# Patient Record
Sex: FEMALE | Race: WHITE
[De-identification: names, ages, dates, MRNs, and addresses within clinical notes are randomized per-mention and may not be internally consistent; named-entity substitution may affect disease eponyms.]

---

## 2018-05-18 ENCOUNTER — HOSPITAL ENCOUNTER (INPATIENT)
Dept: HOSPITAL 38 - CC.ED | Age: 78
LOS: 4 days | Discharge: HOME | DRG: 872 | End: 2018-05-22
Attending: FAMILY MEDICINE | Admitting: NURSE PRACTITIONER
Payer: MEDICARE

## 2018-05-18 DIAGNOSIS — D63.8: ICD-10-CM

## 2018-05-18 DIAGNOSIS — M46.92: ICD-10-CM

## 2018-05-18 DIAGNOSIS — Z90.5: ICD-10-CM

## 2018-05-18 DIAGNOSIS — Z88.0: ICD-10-CM

## 2018-05-18 DIAGNOSIS — A41.9: Primary | ICD-10-CM

## 2018-05-18 DIAGNOSIS — N28.9: ICD-10-CM

## 2018-05-18 DIAGNOSIS — N39.0: ICD-10-CM

## 2018-05-18 DIAGNOSIS — Z87.440: ICD-10-CM

## 2018-05-18 DIAGNOSIS — Z79.899: ICD-10-CM

## 2018-05-18 DIAGNOSIS — R91.8: ICD-10-CM

## 2018-05-18 DIAGNOSIS — R05: ICD-10-CM

## 2018-05-18 DIAGNOSIS — I10: ICD-10-CM

## 2018-05-18 LAB
CHLORIDE SERPL-SCNC: 101 MEQ/L (ref 98–106)
SODIUM SERPL-SCNC: 134 MEQ/L (ref 136–145)

## 2018-05-18 PROCEDURE — C9113 INJ PANTOPRAZOLE SODIUM, VIA: HCPCS

## 2018-05-18 NOTE — EDM.PDOC
ED HPI GENERAL MEDICAL PROBLEM





- General


Chief Complaint: General


Stated Complaint: not feeling well


Time Seen by Provider: 05/18/18 21:45


Source of Information: Reports: Patient, Family


History Limitations: Reports: No Limitations





- History of Present Illness


INITIAL COMMENTS - FREE TEXT/NARRATIVE: 


Nidhi is a pleasant 78 year old female with PMH of hypertension and renal cancer

, who presents to the ED with c/o "just not feeling well." She reports that 

starting on Tuesday evening, she started having worsening neck pain. She 

reports she has bad arthritis and initially she just attributed it to that. She 

reports that the days following she started to feel just achy all over. She 

reports that for about the past month she has had a very decreased appetite. 

She reports this started after she started being treated for a UTI. She reports 

she had a hard time tolerating the antibiotic and since then hasn't had much of 

an appetite. She also reports she has pain in her neck and intermittent 

dizziness. She has also noticed that her gait is a little "off balance as well.

" She reports she has a hard time "walking straight." She denies any urinary 

frequency, urgency or dysuria. Denies any abdominal pain, vomiting, diarrhea, 

constipation. Has had some intermittent nausea, but nothing significant. She 

just reports foods don't appeal to her. She reports she has lost weight, but is 

unsure of how much.  She denies any chest pain. Initially denies any shortness 

of breath at rest. Was pointed out to her that she seems to be winded talking 

and she then goes on to state that maybe she has been a little more short of 

breath than normal. Reports occasional nonproductive cough. Has had some 

chills. Denies any headache, fever, weakness. 





 


Onset Date: 05/15/18


Duration: Getting Worse


Location: Reports: Generalized


Associated Symptoms: Reports: Cough, Fever/Chills (chills), Loss of Appetite, 

Nausea/Vomiting (nausea), Shortness of Breath, Weakness.  Denies: Confusion, 

Chest Pain, cough w sputum, Diaphoresis, Headaches, Malaise, Rash, Seizure, 

Syncope


  ** Generalized


Pain Score (Numeric/FACES): 4





- Related Data


 Allergies











Allergy/AdvReac Type Severity Reaction Status Date / Time


 


Penicillins Allergy  Cannot Verified 05/18/18 21:21





   Remember  











Home Meds: 


 Home Meds





Lysine 1,000 mg PO DAILY 05/18/18 [History]


Ramipril 10 mg PO DAILY 05/18/18 [History]


Sertraline [Zoloft] 50 mg PO BEDTIME 05/18/18 [History]


Spironolactone [Aldactone] 25 mg PO DAILY 05/18/18 [History]











Past Medical History


HEENT History: Reports: Cataract


Cardiovascular History: Reports: Hypertension


Oncologic (Cancer) History: Reports: Renal





- Past Surgical History


HEENT Surgical History: Reports: Cataract Surgery


GI Surgical History: Reports: Appendectomy, Colonoscopy





Social & Family History





- Tobacco Use


Smoking Status *Q: Never Smoker


Second Hand Smoke Exposure: No





ED ROS GENERAL





- Review of Systems


Review Of Systems: ROS reveals no pertinent complaints other than HPI.


Constitutional: Reports: Fever, Chills, Weakness, Fatigue, Decreased Appetite, 

Weight Loss


HEENT: Reports: No Symptoms


Respiratory: Reports: Shortness of Breath, Cough.  Denies: Wheezing, Pleuritic 

Chest Pain, Sputum, Hemoptysis


Cardiovascular: Reports: Dyspnea on Exertion, Lightheadedness.  Denies: Chest 

Pain, Edema, Orthopnea, Syncope


Endocrine: Reports: Fatigue


GI/Abdominal: Reports: Decreased Appetite, Nausea.  Denies: Abdominal Pain, 

Black Stool, Bloody Stool, Constipation, Hematemesis, Hematochezia, Melena, 

Vomiting


: Denies: Dysuria, Flank Pain, Frequency, Hematuria, Urgency


Musculoskeletal: Reports: Neck Pain


Skin: Reports: No Symptoms


Neurological: Reports: Dizziness, Difficulty Walking, Weakness, Gait 

Disturbance.  Denies: Confusion, Headache, Numbness, Syncope, Tingling


Psychiatric: Reports: No Symptoms


Hematologic/Lymphatic: Reports: No Symptoms


Immunologic: Reports: No Symptoms





ED EXAM, GENERAL





- Physical Exam


Exam: See Below


Exam Limited By: No Limitations


General Appearance: Alert, WD/WN, No Apparent Distress


Eye Exam: Bilateral Eye: EOMI, Normal Fundi, Normal Inspection, PERRL


Ears: Normal External Exam, Normal Canal, Hearing Grossly Normal, Normal TMs


Nose: Normal Inspection, Normal Mucosa, No Blood


Throat/Mouth: Normal Inspection, Normal Lips, Normal Teeth, Normal Gums, Normal 

Oropharynx, Normal Voice, No Airway Compromise


Head: Atraumatic, Normocephalic


Neck: Normal Inspection, Supple, Non-Tender, Full Range of Motion


Respiratory/Chest: No Respiratory Distress, Lungs Clear, Normal Breath Sounds, 

No Accessory Muscle Use, Chest Non-Tender


Cardiovascular: Normal Peripheral Pulses, Regular Rate, Rhythm, No Edema, No 

Gallop, No JVD, No Murmur, No Rub


GI/Abdominal: Normal Bowel Sounds, Soft, Non-Tender, No Organomegaly, No 

Distention, No Abnormal Bruit, No Mass


Back Exam: Normal Inspection, Full Range of Motion.  No: CVA Tenderness (L), 

CVA Tenderness (R), Vertebral Tenderness


Extremities: Normal Inspection, Normal Range of Motion, Non-Tender, Normal 

Capillary Refill, No Pedal Edema


Neurological: Alert, Oriented, CN II-XII Intact, Normal Cognition, Normal Gait, 

Normal Reflexes, No Motor/Sensory Deficits


Psychiatric: Normal Affect, Normal Mood


Skin Exam: Warm, Dry, Intact, Normal Color, No Rash


Lymphatic: No Adenopathy





Course





- Vital Signs


Last Recorded V/S: 


 Last Vital Signs











Temp  97.5 F   05/19/18 03:27


 


Pulse  78   05/19/18 03:27


 


Resp  16   05/19/18 03:27


 


BP  136/75   05/19/18 03:27


 


Pulse Ox  98   05/19/18 03:27














- Orders/Labs/Meds


Orders: 


 Active Orders 24 hr











 Category Date Time Status


 


 Chest 2V [CR] Stat Exams  05/18/18 21:29 Taken


 


 Head wo Cont [CT] Stat Exams  05/18/18 22:17 Taken


 


 CULTURE BLOOD [BC] Stat Lab  05/18/18 21:35 Received


 


 CULTURE BLOOD [BC] Stat Lab  05/18/18 21:40 Received


 


 CULTURE URINE [RM] Stat Lab  05/18/18 22:04 Received








 Medication Orders





Acetaminophen (Tylenol)  650 mg PO Q4H PRN


   PRN Reason: Pain (Mild 1-3)/fever


Albuterol/Ipratropium (Duoneb 3.0-0.5 Mg/3 Ml)  3 ml NEB Q4H PRN


   PRN Reason: Shortness Of Breath/wheezing


Ceftriaxone Sodium (Rocephin)  1 gm IVPUSH Q24H [facility]


   Last Admin: 05/19/18 00:26  Dose: 1 gm


Enoxaparin Sodium (Lovenox)  30 mg SUBCUT Q24H [facility]


   Last Admin: 05/19/18 00:25  Dose: 30 mg


Azithromycin 500 mg/ Sodium (Chloride)  250 mls @ 250 mls/hr IV Q24H [facility]


   Last Admin: 05/19/18 00:25  Dose: 250 mls/hr


Lactated Ringer's (Ringers, Lactated)  1,000 mls @ 125 mls/hr IV ASDIRECTED [place]


   Last Admin: 05/19/18 00:26  Dose: 125 mls/hr


Magnesium Hydroxide (Milk Of Magnesia)  30 ml PO Q12H PRN


   PRN Reason: Constipation


Temazepam (Restoril)  15 mg PO BEDTIME PRN


   PRN Reason: Sleep








Labs: 


 Laboratory Tests











  05/18/18 05/18/18 05/18/18 Range/Units





  21:35 21:35 21:35 


 


WBC  13.8 H    (5.0-10.0)  10^3/uL


 


RBC  2.91 L    (4.00-5.50)  10^6/uL


 


Hgb  9.8 L    (12.0-16.0)  g/dL


 


Hct  29.4 L    (37.0-47.0)  %


 


MCV  101.0 H    (82.0-94.0)  fL


 


MCH  33.7 H    (27.0-32.0)  pg


 


MCHC  33.3    (33.0-38.0)  g/dL


 


RDW Coeff of [place]  13.0    (11.0-15.0)  %


 


Plt Count  130 L    (150-400)  10^3/uL


 


Neut % (Auto)  80.0    (35-85)  %


 


Lymph % (Auto)  9.7 L    (10-55)  %


 


Mono % (Auto)  10.0    (0-16)  %


 


Eos % (Auto)  0.2    (0-5)  %


 


Baso % (Auto)  0.1    (0-3)  %


 


Neut # (Auto)  11.00 H    (1.80-7.00)  10^3/uL


 


Lymph # (Auto)  1.33    (1.00-4.80)  10^3/uL


 


Mono # (Auto)  1.38 H    (0.00-0.80)  10^3/uL


 


Eos # (Auto)  0.03    (0.00-0.45)  10^3/uL


 


Baso # (Auto)  0.01    10^3/uL


 


D-Dimer, Quantitative     (0.00-0.50)  


 


Sodium   134 L   (136-145)  mEq/L


 


Potassium   3.8   (3.5-5.0)  mEq/L


 


Chloride   101   ()  mEq/L


 


Carbon Dioxide   17 L   (21-32)  mmol/L


 


BUN   48 H   (7-18)  mg/dL


 


Creatinine   2.2 H   (0.6-1.0)  mg/dL


 


Est Cr Clr Drug Dosing   16.13   mL/min


 


Estimated GFR (MDRD)   21 L   (>=60)  mL/min


 


Glucose   107 H   (75-99)  mg/dL


 


Lactic Acid    0.7  (0.4-2.0)  mmol/L


 


Calcium   9.5   (8.4-10.1)  mg/dL


 


Total Bilirubin   0.5   (0.0-1.0)  mg/dL


 


AST   12 L   (15-37)  U/L


 


ALT   18   (12-78)  U/L


 


Alkaline Phosphatase   64   ()  U/L


 


Troponin I   < 0.017   (0.00-0.06)  ng/mL


 


C-Reactive Protein   24.0 H   (0.2-0.8)  mg/dL


 


Total Protein   6.6   (6.4-8.2)  g/dL


 


Albumin   3.0 L   (3.4-5.0)  g/dL


 


Urine Color     (YELLOW)  


 


Urine Appearance     (CLEAR)  


 


Urine pH     (4.5-8.0)  


 


Ur Specific Gravity     (1.003-1.020)  


 


Urine Protein     (NEGATIVE)  mg/dL


 


Urine Glucose (UA)     (NEGATIVE)  mg/dL


 


Urine Ketones     (NEGATIVE)  mg/dL


 


Urine Occult Blood     (NEGATIVE)  


 


Urine Nitrite     (NEGATIVE)  


 


Urine Bilirubin     (NEGATIVE)  


 


Urine Urobilinogen     (0.2-1.0)  EU/dL


 


Ur Leukocyte Esterase     (NEGATIVE)  


 


Urine RBC     (0-5)  /HPF


 


Urine WBC     (0-5)  /HPF


 


Ur Squamous Epith Cells     (NOT SEEN)  /HPF


 


Urine Bacteria     (NOT SEEN)  /HPF














  05/18/18 05/18/18 Range/Units





  21:35 21:53 


 


WBC    (5.0-10.0)  10^3/uL


 


RBC    (4.00-5.50)  10^6/uL


 


Hgb    (12.0-16.0)  g/dL


 


Hct    (37.0-47.0)  %


 


MCV    (82.0-94.0)  fL


 


MCH    (27.0-32.0)  pg


 


MCHC    (33.0-38.0)  g/dL


 


RDW Coeff of [place]    (11.0-15.0)  %


 


Plt Count    (150-400)  10^3/uL


 


Neut % (Auto)    (35-85)  %


 


Lymph % (Auto)    (10-55)  %


 


Mono % (Auto)    (0-16)  %


 


Eos % (Auto)    (0-5)  %


 


Baso % (Auto)    (0-3)  %


 


Neut # (Auto)    (1.80-7.00)  10^3/uL


 


Lymph # (Auto)    (1.00-4.80)  10^3/uL


 


Mono # (Auto)    (0.00-0.80)  10^3/uL


 


Eos # (Auto)    (0.00-0.45)  10^3/uL


 


Baso # (Auto)    10^3/uL


 


D-Dimer, Quantitative  0.61 H   (0.00-0.50)  


 


Sodium    (136-145)  mEq/L


 


Potassium    (3.5-5.0)  mEq/L


 


Chloride    ()  mEq/L


 


Carbon Dioxide    (21-32)  mmol/L


 


BUN    (7-18)  mg/dL


 


Creatinine    (0.6-1.0)  mg/dL


 


Est Cr Clr Drug Dosing    mL/min


 


Estimated GFR (MDRD)    (>=60)  mL/min


 


Glucose    (75-99)  mg/dL


 


Lactic Acid    (0.4-2.0)  mmol/L


 


Calcium    (8.4-10.1)  mg/dL


 


Total Bilirubin    (0.0-1.0)  mg/dL


 


AST    (15-37)  U/L


 


ALT    (12-78)  U/L


 


Alkaline Phosphatase    ()  U/L


 


Troponin I    (0.00-0.06)  ng/mL


 


C-Reactive Protein    (0.2-0.8)  mg/dL


 


Total Protein    (6.4-8.2)  g/dL


 


Albumin    (3.4-5.0)  g/dL


 


Urine Color   Yellow  (YELLOW)  


 


Urine Appearance   Clear  (CLEAR)  


 


Urine pH   5.5  (4.5-8.0)  


 


Ur Specific Gravity   1.015  (1.003-1.020)  


 


Urine Protein   100 H  (NEGATIVE)  mg/dL


 


Urine Glucose (UA)   Negative  (NEGATIVE)  mg/dL


 


Urine Ketones   Negative  (NEGATIVE)  mg/dL


 


Urine Occult Blood   Negative  (NEGATIVE)  


 


Urine Nitrite   Negative  (NEGATIVE)  


 


Urine Bilirubin   Negative  (NEGATIVE)  


 


Urine Urobilinogen   0.2  (0.2-1.0)  EU/dL


 


Ur Leukocyte Esterase   Moderate H  (NEGATIVE)  


 


Urine RBC   Not seen  (0-5)  /HPF


 


Urine WBC   30-40 H  (0-5)  /HPF


 


Ur Squamous Epith Cells   Few H  (NOT SEEN)  /HPF


 


Urine Bacteria   Few H  (NOT SEEN)  /HPF











Meds: 


Medications











Generic Name Dose Route Start Last Admin





  Trade Name Freq  PRN Reason Stop Dose Admin


 


Acetaminophen  650 mg  05/18/18 23:27  





  Tylenol  PO   





  Q4H PRN   





  Pain (Mild 1-3)/fever   





     





     





     


 


Albuterol/Ipratropium  3 ml  05/18/18 23:27  





  Duoneb 3.0-0.5 Mg/3 Ml  NEB   





  Q4H PRN   





  Shortness Of Breath/wheezing   





     





     





     


 


Ceftriaxone Sodium  1 gm  05/19/18 00:00  05/19/18 00:26





  Rocephin  IVPUSH   1 gm





  Q24H [place]   Administration





     





     





     





     


 


Enoxaparin Sodium  30 mg  05/18/18 23:00  05/19/18 00:25





  Lovenox  SUBCUT   30 mg





  Q24H [place]   Administration





     





     





     





     


 


Azithromycin 500 mg/ Sodium  250 mls @ 250 mls/hr  05/18/18 23:00  05/19/18 00:

25





  Chloride  IV   250 mls/hr





  Q24H [place]   Administration





     





     





     





     


 


Lactated Ringer's  1,000 mls @ 125 mls/hr  05/18/18 23:27  05/19/18 00:26





  Ringers, Lactated  IV   125 mls/hr





  ASDIRECTED [place]   Administration





     





     





     





     


 


Magnesium Hydroxide  30 ml  05/18/18 23:27  





  Milk Of Magnesia  PO   





  Q12H PRN   





  Constipation   





     





     





     


 


Temazepam  15 mg  05/18/18 23:27  





  Restoril  PO   





  BEDTIME PRN   





  Sleep   





     





     





     














- Re-Assessments/Exams


Free Text/Narrative Re-Assessment/Exam: 





Reviewed labs, EKG, and CXR with patient and family. 











Departure





- Departure


Time of Disposition: 23:22


Disposition: Admitted As Inpatient 64


Condition: Fair


Clinical Impression: 


UTI (urinary tract infection)


Qualifiers:


 Urinary tract infection type: site unspecified Hematuria presence: without 

hematuria Qualified Code(s): N39.0 - Urinary tract infection, site not specified








- Discharge Information





- Problem List & Annotations


(1) Anemia


SNOMED Code(s): 310119800


   Code(s): D64.9 - ANEMIA, UNSPECIFIED   Status: Chronic   Current Visit: Yes 

  


Qualifiers: 


   Anemia type: unspecified type   Qualified Code(s): D64.9 - Anemia, 

unspecified   





(2) Renal insufficiency


SNOMED Code(s): 817544458, 225778049


   Code(s): N28.9 - DISORDER OF KIDNEY AND URETER, UNSPECIFIED   Status: Acute 

  Current Visit: Yes   





(3) UTI (urinary tract infection)


SNOMED Code(s): 10668090


   Code(s): N39.0 - URINARY TRACT INFECTION, SITE NOT SPECIFIED   Status: Acute

   Current Visit: Yes   


Qualifiers: 


   Urinary tract infection type: site unspecified   Hematuria presence: without 

hematuria   Qualified Code(s): N39.0 - Urinary tract infection, site not 

specified   





(4) Mass of right lung


SNOMED Code(s): 994568041


   Code(s): R91.8 - OTHER NONSPECIFIC ABNORMAL FINDING OF LUNG FIELD   Status: 

Chronic   Priority: High   Current Visit: Yes   





- Problem List Review


Problem List Initiated/Reviewed/Updated: Yes





- My Orders


Last 24 Hours: 


My Active Orders





05/18/18 21:29


Chest 2V [CR] Stat 





05/18/18 21:35


CULTURE BLOOD [BC] Stat 





05/18/18 21:40


CULTURE BLOOD [BC] Stat 





05/18/18 22:04


CULTURE URINE [RM] Stat 





05/18/18 22:17


Head wo Cont [CT] Stat 














- Assessment/Plan


Admission H&P: Please use this note as an admission H&P


Last 24 Hours: 


My Active Orders





05/18/18 21:29


Chest 2V [CR] Stat 





05/18/18 21:35


CULTURE BLOOD [BC] Stat 





05/18/18 21:40


CULTURE BLOOD [BC] Stat 





05/18/18 22:04


CULTURE URINE [RM] Stat 





05/18/18 22:17


Head wo Cont [CT] Stat 











Plan: 


Patients WBC elevated to 13.4. CRP 24. D-dimer 0.61. CXR concerning for RML 

pneumonia vs. lung mass. Radiologist feels this is more of a lung mass rather 

than pneumonia. He recommends further imaging with MRI. 


UA positive. Will get urine culture. Will start IV rocephin and azithromycin to 

cover both possible RML pneumonia and UTI. 


Creatinine 2.2. Will give gentle IV hydration. Unable to do CT with contrast 

until kidney function improves. 


Hgb 9.8. Will check iron panel, vitamin B12, and folic acid in am. 


Head CT negative. 


I discussed at length with family concern for lung mass. We will admit acute 

with telemetry to Dr. fulton and reassess kidney function in am and options for 

further imaging. Family and patient agreeable with plan at this time.

## 2018-05-19 LAB
CHLORIDE SERPL-SCNC: 106 MEQ/L (ref 98–106)
SODIUM SERPL-SCNC: 139 MEQ/L (ref 136–145)

## 2018-05-19 RX ADMIN — BUDESONIDE AND FORMOTEROL FUMARATE DIHYDRATE SCH MG: 160; 4.5 AEROSOL RESPIRATORY (INHALATION) at 09:58

## 2018-05-19 NOTE — PCM.PN
- General Info


Date of Service: 05/19/18


Admission Dx/Problem (Free Text): 


Right lung Mass


UTI


Renal Insufficiency- s/p nephrectomy


Anemia


Subjective Update: 





Patient reports she is feeling much better this morning. She reports she feels 

like she has some energy back. 


Functional Status: Reports: Pain Controlled, Tolerating Diet, Ambulating, 

Urinating.  Denies: New Symptoms


Pain Score: 0





- Review of Systems


General: Reports: Weakness, Fatigue.  Denies: Fever, Chills


HEENT: Reports: No Symptoms


Pulmonary: Reports: Cough.  Denies: Shortness of Breath, Pleuritic Chest Pain, 

Sputum, Hemoptysis, Wheezing


Cardiovascular: Reports: No Symptoms.  Denies: Chest Pain, Dyspnea on Exertion, 

Orthopnea, Edema, Lightheadedness


Gastrointestinal: Reports: Decreased Appetite.  Denies: Abdominal Pain, Diarrhea

, Nausea, Vomiting


Genitourinary: Reports: Frequency.  Denies: Dysuria, Urgency, Hematuria, Flank 

Pain


Musculoskeletal: Reports: No Symptoms


Skin: Reports: No Symptoms


Neurological: Reports: Weakness.  Denies: Confusion, Dizziness, Headache, 

Numbness, Syncope, Tingling


Psychiatric: Reports: No Symptoms





- Patient Data


Vitals - Most Recent: 


 Last Vital Signs











Temp  97.1 F   05/19/18 07:36


 


Pulse  81   05/19/18 07:36


 


Resp  16   05/19/18 07:36


 


BP  134/67   05/19/18 07:36


 


Pulse Ox  99   05/19/18 07:36











Weight - Most Recent: 120 lb 8 oz


I&O - Last 24 Hours: 


 Intake & Output











 05/18/18 05/19/18 05/19/18





 22:59 06:59 14:59


 


Intake Total  50 1000


 


Balance  50 1000











Lab Results Last 24 Hours: 


 Laboratory Results - last 24 hr











  05/18/18 05/18/18 05/18/18 Range/Units





  21:35 21:35 21:35 


 


WBC  13.8 H    (5.0-10.0)  10^3/uL


 


RBC  2.91 L    (4.00-5.50)  10^6/uL


 


Hgb  9.8 L    (12.0-16.0)  g/dL


 


Hct  29.4 L    (37.0-47.0)  %


 


MCV  101.0 H    (82.0-94.0)  fL


 


MCH  33.7 H    (27.0-32.0)  pg


 


MCHC  33.3    (33.0-38.0)  g/dL


 


RDW Coeff of Jasper  13.0    (11.0-15.0)  %


 


Plt Count  130 L    (150-400)  10^3/uL


 


Neut % (Auto)  80.0    (35-85)  %


 


Lymph % (Auto)  9.7 L    (10-55)  %


 


Mono % (Auto)  10.0    (0-16)  %


 


Eos % (Auto)  0.2    (0-5)  %


 


Baso % (Auto)  0.1    (0-3)  %


 


Neut # (Auto)  11.00 H    (1.80-7.00)  10^3/uL


 


Lymph # (Auto)  1.33    (1.00-4.80)  10^3/uL


 


Mono # (Auto)  1.38 H    (0.00-0.80)  10^3/uL


 


Eos # (Auto)  0.03    (0.00-0.45)  10^3/uL


 


Baso # (Auto)  0.01    10^3/uL


 


ESR     (0-20)  mm/hr


 


D-Dimer, Quantitative     (0.00-0.50)  


 


Sodium   134 L   (136-145)  mEq/L


 


Potassium   3.8   (3.5-5.0)  mEq/L


 


Chloride   101   ()  mEq/L


 


Carbon Dioxide   17 L   (21-32)  mmol/L


 


BUN   48 H   (7-18)  mg/dL


 


Creatinine   2.2 H   (0.6-1.0)  mg/dL


 


Est Cr Clr Drug Dosing   16.13   mL/min


 


Estimated GFR (MDRD)   21 L   (>=60)  mL/min


 


Glucose   107 H   (75-99)  mg/dL


 


Lactic Acid    0.7  (0.4-2.0)  mmol/L


 


Calcium   9.5   (8.4-10.1)  mg/dL


 


Magnesium     (1.8-2.4)  mg/dL


 


Total Bilirubin   0.5   (0.0-1.0)  mg/dL


 


AST   12 L   (15-37)  U/L


 


ALT   18   (12-78)  U/L


 


Alkaline Phosphatase   64   ()  U/L


 


Troponin I   < 0.017   (0.00-0.06)  ng/mL


 


C-Reactive Protein   24.0 H   (0.2-0.8)  mg/dL


 


Total Protein   6.6   (6.4-8.2)  g/dL


 


Albumin   3.0 L   (3.4-5.0)  g/dL


 


Vitamin B12     (193-986)  PG/ML


 


Folate     (>8.6)  NG/ML


 


Urine Color     (YELLOW)  


 


Urine Appearance     (CLEAR)  


 


Urine pH     (4.5-8.0)  


 


Ur Specific Gravity     (1.003-1.020)  


 


Urine Protein     (NEGATIVE)  mg/dL


 


Urine Glucose (UA)     (NEGATIVE)  mg/dL


 


Urine Ketones     (NEGATIVE)  mg/dL


 


Urine Occult Blood     (NEGATIVE)  


 


Urine Nitrite     (NEGATIVE)  


 


Urine Bilirubin     (NEGATIVE)  


 


Urine Urobilinogen     (0.2-1.0)  EU/dL


 


Ur Leukocyte Esterase     (NEGATIVE)  


 


Urine RBC     (0-5)  /HPF


 


Urine WBC     (0-5)  /HPF


 


Ur Squamous Epith Cells     (NOT SEEN)  /HPF


 


Urine Bacteria     (NOT SEEN)  /HPF














  05/18/18 05/18/18 05/19/18 Range/Units





  21:35 21:53 07:24 


 


WBC     (5.0-10.0)  10^3/uL


 


RBC     (4.00-5.50)  10^6/uL


 


Hgb     (12.0-16.0)  g/dL


 


Hct     (37.0-47.0)  %


 


MCV     (82.0-94.0)  fL


 


MCH     (27.0-32.0)  pg


 


MCHC     (33.0-38.0)  g/dL


 


RDW Coeff of Jasper     (11.0-15.0)  %


 


Plt Count     (150-400)  10^3/uL


 


Neut % (Auto)     (35-85)  %


 


Lymph % (Auto)     (10-55)  %


 


Mono % (Auto)     (0-16)  %


 


Eos % (Auto)     (0-5)  %


 


Baso % (Auto)     (0-3)  %


 


Neut # (Auto)     (1.80-7.00)  10^3/uL


 


Lymph # (Auto)     (1.00-4.80)  10^3/uL


 


Mono # (Auto)     (0.00-0.80)  10^3/uL


 


Eos # (Auto)     (0.00-0.45)  10^3/uL


 


Baso # (Auto)     10^3/uL


 


ESR     (0-20)  mm/hr


 


D-Dimer, Quantitative  0.61 H    (0.00-0.50)  


 


Sodium     (136-145)  mEq/L


 


Potassium     (3.5-5.0)  mEq/L


 


Chloride     ()  mEq/L


 


Carbon Dioxide     (21-32)  mmol/L


 


BUN     (7-18)  mg/dL


 


Creatinine     (0.6-1.0)  mg/dL


 


Est Cr Clr Drug Dosing     mL/min


 


Estimated GFR (MDRD)     (>=60)  mL/min


 


Glucose     (75-99)  mg/dL


 


Lactic Acid     (0.4-2.0)  mmol/L


 


Calcium     (8.4-10.1)  mg/dL


 


Magnesium    1.9  (1.8-2.4)  mg/dL


 


Total Bilirubin     (0.0-1.0)  mg/dL


 


AST     (15-37)  U/L


 


ALT     (12-78)  U/L


 


Alkaline Phosphatase     ()  U/L


 


Troponin I     (0.00-0.06)  ng/mL


 


C-Reactive Protein     (0.2-0.8)  mg/dL


 


Total Protein     (6.4-8.2)  g/dL


 


Albumin     (3.4-5.0)  g/dL


 


Vitamin B12     (193-986)  PG/ML


 


Folate     (>8.6)  NG/ML


 


Urine Color   Yellow   (YELLOW)  


 


Urine Appearance   Clear   (CLEAR)  


 


Urine pH   5.5   (4.5-8.0)  


 


Ur Specific Gravity   1.015   (1.003-1.020)  


 


Urine Protein   100 H   (NEGATIVE)  mg/dL


 


Urine Glucose (UA)   Negative   (NEGATIVE)  mg/dL


 


Urine Ketones   Negative   (NEGATIVE)  mg/dL


 


Urine Occult Blood   Negative   (NEGATIVE)  


 


Urine Nitrite   Negative   (NEGATIVE)  


 


Urine Bilirubin   Negative   (NEGATIVE)  


 


Urine Urobilinogen   0.2   (0.2-1.0)  EU/dL


 


Ur Leukocyte Esterase   Moderate H   (NEGATIVE)  


 


Urine RBC   Not seen   (0-5)  /HPF


 


Urine WBC   30-40 H   (0-5)  /HPF


 


Ur Squamous Epith Cells   Few H   (NOT SEEN)  /HPF


 


Urine Bacteria   Few H   (NOT SEEN)  /HPF














  05/19/18 05/19/18 Range/Units





  07:24 07:24 


 


WBC  10.9 H   (5.0-10.0)  10^3/uL


 


RBC  2.71 L   (4.00-5.50)  10^6/uL


 


Hgb  9.1 L   (12.0-16.0)  g/dL


 


Hct  27.8 L   (37.0-47.0)  %


 


MCV  102.6 H   (82.0-94.0)  fL


 


MCH  33.6 H   (27.0-32.0)  pg


 


MCHC  32.7 L   (33.0-38.0)  g/dL


 


RDW Coeff of [place]asper  12.8   (11.0-15.0)  %


 


Plt Count  127 L   (150-400)  10^3/uL


 


Neut % (Auto)  75.4   (35-85)  %


 


Lymph % (Auto)  14.1   (10-55)  %


 


Mono % (Auto)  9.8   (0-16)  %


 


Eos % (Auto)  0.6   (0-5)  %


 


Baso % (Auto)  0.1   (0-3)  %


 


Neut # (Auto)  8.21 H   (1.80-7.00)  10^3/uL


 


Lymph # (Auto)  1.53   (1.00-4.80)  10^3/uL


 


Mono # (Auto)  1.06 H   (0.00-0.80)  10^3/uL


 


Eos # (Auto)  0.06   (0.00-0.45)  10^3/uL


 


Baso # (Auto)  0.01   10^3/uL


 


ESR  80 H   (0-20)  mm/hr


 


D-Dimer, Quantitative    (0.00-0.50)  


 


Sodium   139  (136-145)  mEq/L


 


Potassium   3.7  (3.5-5.0)  mEq/L


 


Chloride   106  ()  mEq/L


 


Carbon Dioxide   19 L  (21-32)  mmol/L


 


BUN   46 H  (7-18)  mg/dL


 


Creatinine   2.0 H  (0.6-1.0)  mg/dL


 


Est Cr Clr Drug Dosing   17.74  mL/min


 


Estimated GFR (MDRD)   24 L  (>=60)  mL/min


 


Glucose   90  (75-99)  mg/dL


 


Lactic Acid    (0.4-2.0)  mmol/L


 


Calcium   9.3  (8.4-10.1)  mg/dL


 


Magnesium    (1.8-2.4)  mg/dL


 


Total Bilirubin    (0.0-1.0)  mg/dL


 


AST    (15-37)  U/L


 


ALT    (12-78)  U/L


 


Alkaline Phosphatase    ()  U/L


 


Troponin I    (0.00-0.06)  ng/mL


 


C-Reactive Protein   30.4 H  (0.2-0.8)  mg/dL


 


Total Protein    (6.4-8.2)  g/dL


 


Albumin    (3.4-5.0)  g/dL


 


Vitamin B12   700  (193-986)  PG/ML


 


Folate   14.2  (>8.6)  NG/ML


 


Urine Color    (YELLOW)  


 


Urine Appearance    (CLEAR)  


 


Urine pH    (4.5-8.0)  


 


Ur Specific Gravity    (1.003-1.020)  


 


Urine Protein    (NEGATIVE)  mg/dL


 


Urine Glucose (UA)    (NEGATIVE)  mg/dL


 


Urine Ketones    (NEGATIVE)  mg/dL


 


Urine Occult Blood    (NEGATIVE)  


 


Urine Nitrite    (NEGATIVE)  


 


Urine Bilirubin    (NEGATIVE)  


 


Urine Urobilinogen    (0.2-1.0)  EU/dL


 


Ur Leukocyte Esterase    (NEGATIVE)  


 


Urine RBC    (0-5)  /HPF


 


Urine WBC    (0-5)  /HPF


 


Ur Squamous Epith Cells    (NOT SEEN)  /HPF


 


Urine Bacteria    (NOT SEEN)  /HPF











Canelo Results Last 24 Hours: 


 Microbiology











 05/18/18 22:04 Urine Culture - Preliminary





 Urine, Clean Catch 


 


 05/18/18 21:40 Influenza Type A Antigen Screen - Final





 Nasal, Unspecified    NEGATIVE INFLUENZA A VIRUS AG





 Influenza Type B Antigen Screen - Final





    NEGATIVE INFLUENZA B VIRUS AG











Med Orders - Current: 


 Current Medications





Acetaminophen (Tylenol)  650 mg PO Q4H PRN


   PRN Reason: Pain (Mild 1-3)/fever


Albuterol/Ipratropium (Duoneb 3.0-0.5 Mg/3 Ml)  3 ml NEB Q4H PRN


   PRN Reason: Shortness Of Breath/wheezing


Ceftriaxone Sodium (Rocephin)  1 gm IVPUSH Q24H [facility]


   Last Admin: 05/19/18 00:26 Dose:  1 gm


Enoxaparin Sodium (Lovenox)  30 mg SUBCUT Q24H [facility]


   Last Admin: 05/19/18 00:25 Dose:  30 mg


Azithromycin 500 mg/ Sodium (Chloride)  250 mls @ 250 mls/hr IV Q24H [facility]


   Last Admin: 05/19/18 00:25 Dose:  250 mls/hr


Lactated Ringer's (Ringers, Lactated)  1,000 mls @ 125 mls/hr IV ASDIRECTED [facility]


   Last Admin: 05/19/18 09:14 Dose:  125 mls/hr


Magnesium Hydroxide (Milk Of Magnesia)  30 ml PO Q12H PRN


   PRN Reason: Constipation


Temazepam (Restoril)  15 mg PO BEDTIME PRN


   PRN Reason: Sleep











- Exam


Quality Assessment: DVT Prophylaxis


General: Alert, Oriented, No Acute Distress


Neck: Supple


Lungs: Clear to Auscultation, Normal Respiratory Effort


Cardiovascular: Regular Rate, Regular Rhythm, No Murmurs


GI/Abdominal Exam: Normal Bowel Sounds, Soft, Non-Tender, No Organomegaly, No 

Distention, No Abnormal Bruit, No Mass, Pelvis Stable


Back Exam: Normal Inspection, Full Range of Motion.  No: CVA Tenderness (L), 

CVA Tenderness (R)


Extremities: Normal Inspection, Normal Range of Motion, Non-Tender, No Pedal 

Edema, Normal Capillary Refill


Skin: Warm, Dry, Intact


Neurological: No New Focal Deficit


Psy/Mental Status: Alert, Normal Affect, Normal Mood





- Problem List & Annotations


(1) Mass of right lung


SNOMED Code(s): 503561834


   Code(s): R91.8 - OTHER NONSPECIFIC ABNORMAL FINDING OF LUNG FIELD   Status: 

Chronic   Priority: High   Current Visit: Yes   





(2) Anemia


SNOMED Code(s): 549222800


   Code(s): D64.9 - ANEMIA, UNSPECIFIED   Status: Chronic   Current Visit: Yes 

  


Qualifiers: 


   Anemia type: unspecified type   Qualified Code(s): D64.9 - Anemia, 

unspecified   





(3) Renal insufficiency


SNOMED Code(s): 374483400, 998491746


   Code(s): N28.9 - DISORDER OF KIDNEY AND URETER, UNSPECIFIED   Status: Acute 

  Current Visit: Yes   





(4) UTI (urinary tract infection)


SNOMED Code(s): 03154829


   Code(s): N39.0 - URINARY TRACT INFECTION, SITE NOT SPECIFIED   Status: Acute

   Current Visit: Yes   


Qualifiers: 


   Urinary tract infection type: site unspecified   Hematuria presence: without 

hematuria   Qualified Code(s): N39.0 - Urinary tract infection, site not 

specified   





- Problem List Review


Problem List Initiated/Reviewed/Updated: Yes





- My Orders


Last 24 Hours: 


My Active Orders





05/18/18 21:29


Chest 2V [CR] Stat 





05/18/18 21:35


CULTURE BLOOD [BC] Stat 





05/18/18 21:40


CULTURE BLOOD [BC] Stat 





05/18/18 22:04


CULTURE URINE [RM] Stat 





05/18/18 22:17


Head wo Cont [CT] Stat 





05/18/18 22:59


Resuscitation Status Routine 





05/18/18 23:00


Azithromycin [Zithromax] 500 mg   Sodium Chloride 0.9% [Normal Saline] 250 ml 

IV Q24H 


Enoxaparin [Lovenox]   30 mg SUBCUT Q24H 





05/18/18 23:27


Patient Status [ADT] Routine 


Cardiac Monitoring [RC] 0800,2000 


Intake and Output [RC] 0600,1800 


Oxygen Therapy [RC] .PRN 


RT Aerosol Therapy [RC] .PRN 


Up With Assistance [RC] .PRN 


Vital Signs [RC] 0000,0400,0800,1200,1600,2000 


CULTURE SPUTUM + SMEAR [RM] Stat 


Acetaminophen [Tylenol]   650 mg PO Q4H PRN 


Albuterol/Ipratropium [DuoNeb 3.0-0.5 MG/3 ML]   3 ml NEB Q4H PRN 


Lactated Ringers [Ringers, Lactated] 1,000 ml IV ASDIRECTED 


Magnesium Hydroxide [Milk of Magnesia]   30 ml PO Q12H PRN 


Temazepam [Restoril]   15 mg PO BEDTIME PRN 





05/19/18 00:00


cefTRIAXone [Rocephin]   1 gm IVPUSH Q24H 





05/19/18 07:24


IRON PNL (FE, TIBC, ANJELICA, %SAT) [REF] Routine 





05/20/18 06:00


BASIC METABOLIC PANEL,BMP [CHEM] DAILY 


C-REACTIVE PROTEIN [CHEM] DAILY 


CBC WITH AUTO DIFF [HEME] DAILY 





05/21/18 06:00


BASIC METABOLIC PANEL,BMP [CHEM] DAILY 


C-REACTIVE PROTEIN [CHEM] DAILY 


CBC WITH AUTO DIFF [HEME] DAILY 














- Assessment


Assessment:: 








UTI


Right lung mass


Renal Insufficiency s/p hx of nephrectomy 


anemia











- Plan


Plan:: 








Contacted [facility] to discuss case with pulmonoligist. They 

recommend contacting [facility] Monday morning to discuss bronchoscopy. 


Creatinine improved to 2.0 from 2.2. Na normal today. Hgb stable at 9.1. 

Vitamin B12 and folic acid normal. Awaiting iron panel. Will start ferrous 

sulfate. 


Patient reports she is feeling much better. Preliminary urine culture negative 

at 8 hours. WBC improved from 13.8 to 10.9. CRP did increase from 24 to 30.4. 

Will continue both azithromycin and rocephin. Will recheck chest Xray tomorrow 

to see if there is possibly any change in suspected right lung mass. Sed rate 

significantly elevated at 80. Lung mass very suspicious for carcinoma. Patient 

and family agree with plan.

## 2018-05-20 LAB
CHLORIDE SERPL-SCNC: 109 MEQ/L (ref 98–106)
SODIUM SERPL-SCNC: 142 MEQ/L (ref 136–145)

## 2018-05-20 RX ADMIN — BUDESONIDE AND FORMOTEROL FUMARATE DIHYDRATE SCH MG: 160; 4.5 AEROSOL RESPIRATORY (INHALATION) at 07:33

## 2018-05-20 NOTE — PCM.PN
- General Info


Date of Service: 05/20/18


Admission Dx/Problem (Free Text): 


Right lung Mass


UTI


Renal Insufficiency- s/p nephrectomy


Anemia


Subjective Update: 





Patient reports she is feeling well today. She reports she has been up 

ambulating in the rao some. Reports her appetite has improved. She still is 

eating small amounts, but reports she is no longer nauseated with foods. She 

denies any pain. Denies any cough or shortness of breath. She has had a couple 

loose stools over the past 24 hours. She otherwise has no complaints. 


Functional Status: Reports: Pain Controlled, Tolerating Diet, Ambulating, 

Urinating.  Denies: New Symptoms





- Review of Systems


General: Denies: Fever, Weakness, Fatigue, Chills


HEENT: Reports: No Symptoms


Pulmonary: Reports: No Symptoms.  Denies: Shortness of Breath, Pleuritic Chest 

Pain, Cough, Sputum, Wheezing


Cardiovascular: Reports: No Symptoms.  Denies: Chest Pain, Dyspnea on Exertion, 

Edema, Lightheadedness


Gastrointestinal: Reports: Decreased Appetite, Diarrhea.  Denies: Abdominal Pain

, Hematochezia, Melena, Nausea, Vomiting


Genitourinary: Reports: No Symptoms.  Denies: Dysuria, Frequency, Urgency


Musculoskeletal: Reports: Neck Pain


Skin: Reports: No Symptoms


Neurological: Reports: No Symptoms.  Denies: Confusion, Dizziness, Difficulty 

Walking, Weakness


Psychiatric: Reports: No Symptoms





- Patient Data


Vitals - Most Recent: 


 Last Vital Signs











Temp  98.4 F   05/20/18 03:52


 


Pulse  75   05/20/18 03:52


 


Resp  14   05/20/18 03:52


 


BP  141/69 H  05/20/18 03:52


 


Pulse Ox  96   05/20/18 03:52











Weight - Most Recent: 120 lb 8 oz


I&O - Last 24 Hours: 


 Intake & Output











 05/19/18 05/20/18 05/20/18





 22:59 06:59 14:59


 


Intake Total 1648 150 


 


Output Total 400  


 


Balance 1248 150 











Lab Results Last 24 Hours: 


 Laboratory Results - last 24 hr











  05/20/18 05/20/18 Range/Units





  07:02 07:02 


 


WBC  6.8   (5.0-10.0)  10^3/uL


 


RBC  2.53 L   (4.00-5.50)  10^6/uL


 


Hgb  8.6 L   (12.0-16.0)  g/dL


 


Hct  26.2 L   (37.0-47.0)  %


 


MCV  103.6 H   (82.0-94.0)  fL


 


MCH  34.0 H   (27.0-32.0)  pg


 


MCHC  32.8 L   (33.0-38.0)  g/dL


 


RDW Coeff of Jasper  12.9   (11.0-15.0)  %


 


Plt Count  133 L   (150-400)  10^3/uL


 


Neut % (Auto)  64.8   (35-85)  %


 


Lymph % (Auto)  22.2   (10-55)  %


 


Mono % (Auto)  10.6   (0-16)  %


 


Eos % (Auto)  2.1   (0-5)  %


 


Baso % (Auto)  0.3   (0-3)  %


 


Neut # (Auto)  4.39   (1.80-7.00)  10^3/uL


 


Lymph # (Auto)  1.50   (1.00-4.80)  10^3/uL


 


Mono # (Auto)  0.72   (0.00-0.80)  10^3/uL


 


Eos # (Auto)  0.14   (0.00-0.45)  10^3/uL


 


Baso # (Auto)  0.02   10^3/uL


 


Sodium   142  (136-145)  mEq/L


 


Potassium   3.8  (3.5-5.0)  mEq/L


 


Chloride   109 H  ()  mEq/L


 


Carbon Dioxide   21  (21-32)  mmol/L


 


BUN   36 H  (7-18)  mg/dL


 


Creatinine   1.7 H  (0.6-1.0)  mg/dL


 


Est Cr Clr Drug Dosing   20.87  mL/min


 


Estimated GFR (MDRD)   29 L  (>=60)  mL/min


 


Glucose   90  (75-99)  mg/dL


 


Calcium   8.8  (8.4-10.1)  mg/dL


 


C-Reactive Protein   11.8 H  (0.2-0.8)  mg/dL











Canelo Results Last 24 Hours: 


 Microbiology











 05/18/18 22:04 Urine Culture - Preliminary





 Urine, Clean Catch 


 


 05/18/18 21:40 Aerobic Blood Culture - Preliminary





 Blood - Venous - Lab Draw Anaerobic Blood Culture - Preliminary


 


 05/18/18 21:35 Aerobic Blood Culture - Preliminary





 Blood - Venous Anaerobic Blood Culture - Preliminary











Med Orders - Current: 


 Current Medications





Acetaminophen (Tylenol)  650 mg PO Q4H PRN


   PRN Reason: Pain (Mild 1-3)/fever


   Last Admin: 05/20/18 02:04 Dose:  650 mg


Albuterol/Ipratropium (Duoneb 3.0-0.5 Mg/3 Ml)  3 ml NEB Q4H PRN


   PRN Reason: Shortness Of Breath/wheezing


Ceftriaxone Sodium (Rocephin)  1 gm IVPUSH Q24H [facility]


   Last Admin: 05/20/18 00:00 Dose:  1 gm


Enoxaparin Sodium (Lovenox)  30 mg SUBCUT Q24H [facility]


   Last Admin: 05/19/18 23:00 Dose:  30 mg


Azithromycin 500 mg/ Sodium (Chloride)  250 mls @ 250 mls/hr IV Q24H [facility]


   Last Admin: 05/19/18 23:00 Dose:  250 mls/hr


Lactated Ringer's (Ringers, Lactated)  1,000 mls @ 75 mls/hr IV ASDIRECTED [facility]


   Last Admin: 05/19/18 19:36 Dose:  125 mls/hr


Lisinopril (Prinivil)  20 mg PO DAILY [facility]


   Last Admin: 05/20/18 07:35 Dose:  20 mg


Magnesium Hydroxide (Milk Of Magnesia)  30 ml PO Q12H PRN


   PRN Reason: Constipation


(Lysine [Lysine] (500mg**Own Med**)  1,000 mg PO DAILY [facility]


   Last Admin: 05/20/18 07:33 Dose:  1,000 mg


Sertraline HCl (Zoloft)  50 mg PO BEDTIME [facility]


   Last Admin: 05/19/18 19:36 Dose:  50 mg


Spironolactone (Aldactone)  25 mg PO DAILY [facility]


   Last Admin: 05/20/18 07:34 Dose:  25 mg


Temazepam (Restoril)  15 mg PO BEDTIME PRN


   PRN Reason: Sleep











- Exam


Quality Assessment: DVT Prophylaxis


General: Alert, Oriented, No Acute Distress


Neck: Supple


Lungs: Clear to Auscultation, Normal Respiratory Effort, Other (slightly 

coarser breath sounds to RML)


Cardiovascular: Regular Rate, Regular Rhythm


GI/Abdominal Exam: Normal Bowel Sounds, Soft, Non-Tender, No Organomegaly, No 

Distention, No Abnormal Bruit, No Mass, Pelvis Stable


Extremities: Normal Inspection, Normal Range of Motion, Non-Tender, No Pedal 

Edema, Normal Capillary Refill


Skin: Warm, Dry, Intact


Neurological: No New Focal Deficit


Psy/Mental Status: Alert, Normal Affect, Normal Mood





- Problem List & Annotations


(1) Mass of right lung


SNOMED Code(s): 068120929


   Code(s): R91.8 - OTHER NONSPECIFIC ABNORMAL FINDING OF LUNG FIELD   Status: 

Chronic   Priority: High   Current Visit: Yes   





(2) Anemia


SNOMED Code(s): 628151287


   Code(s): D64.9 - ANEMIA, UNSPECIFIED   Status: Chronic   Current Visit: Yes 

  


Qualifiers: 


   Anemia type: unspecified type   Qualified Code(s): D64.9 - Anemia, 

unspecified   





(3) Renal insufficiency


SNOMED Code(s): 908123011, 331348767


   Code(s): N28.9 - DISORDER OF KIDNEY AND URETER, UNSPECIFIED   Status: Acute 

  Current Visit: Yes   





(4) UTI (urinary tract infection)


SNOMED Code(s): 83981931


   Code(s): N39.0 - URINARY TRACT INFECTION, SITE NOT SPECIFIED   Status: Acute

   Current Visit: Yes   


Qualifiers: 


   Urinary tract infection type: site unspecified   Hematuria presence: without 

hematuria   Qualified Code(s): N39.0 - Urinary tract infection, site not 

specified   





(5) Sepsis


SNOMED Code(s): 62440752


   Code(s): A41.9 - SEPSIS, UNSPECIFIED ORGANISM   Status: Acute   Current Visit

: Yes   


Qualifiers: 


   Sepsis type: sepsis due to unspecified organism   Qualified Code(s): A41.9 - 

Sepsis, unspecified organism   





- Problem List Review


Problem List Initiated/Reviewed/Updated: Yes





- My Orders


Last 24 Hours: 


My Active Orders





05/19/18 09:45


Lisinopril [Prinivil]   20 mg PO DAILY 


Lysine [Lysine]   1,000 mg PO DAILY 


Spironolactone [Aldactone]   25 mg PO DAILY 





05/19/18 20:00


Sertraline [Zoloft]   50 mg PO BEDTIME 





05/20/18 12:00


Chest 2V [CR] Routine 





05/21/18 06:00


BASIC METABOLIC PANEL,BMP [CHEM] DAILY 


C-REACTIVE PROTEIN [CHEM] DAILY 


CBC WITH AUTO DIFF [HEME] DAILY 














- Assessment


Assessment:: 








Urosepsis


Right lung mass


Renal Insufficiency s/p hx of nephrectomy 


anemia











- Plan


Plan:: 





5/19/2018


Contacted [facility] to discuss case with pulmonoligist. They 

recommend contacting [facility] Monday morning to discuss bronchoscopy. 


Creatinine improved to 2.0 from 2.2. Na normal today. Hgb stable at 9.1. 

Vitamin B12 and folic acid normal. Awaiting iron panel. Will start ferrous 

sulfate. 


Patient reports she is feeling much better. Preliminary urine culture negative 

at 8 hours. WBC improved from 13.8 to 10.9. CRP did increase from 24 to 30.4. 

Will continue both azithromycin and rocephin. Will recheck chest Xray tomorrow 

to see if there is possibly any change in suspected right lung mass. Sed rate 

significantly elevated at 80. Lung mass very suspicious for carcinoma. Patient 

and family agree with plan. 





5/20/2018


Patient doing much better today. She has been afebrile. She reports her 

appetite is better and she has more energy. She does report she has had a few 

loose stools over the past 24 hours. Denies any pain. 


WBC improved to 6.8. CRP down to 11.8 from 30.4 yesterday. Preliminary blood 

cultures are positive. Preliminary urine culture shows mixed contamination. 

Given patient's improvement on current antibiotic regimen, we will continue 

this and wait for final culture reports. 


Creatinine improved from 2.0 to 1.7. Will continue IVF, but slow rate to 75 mL/

hr. Discussed with family that in order to do any imaging with contrast dye we 

need creatinine < 1.5. Will hold off on imaging and wait until we are able to 

do CT scan. 


Patient reports she has had "arthritis pain" in her neck for quite some time. 

That is her only c/o pain. Family and patient decline imaging at this time 

given chronicity of complaint. 


Patient and family agreeable with plan.

## 2018-05-21 LAB
CHLORIDE SERPL-SCNC: 108 MEQ/L (ref 98–106)
SODIUM SERPL-SCNC: 143 MEQ/L (ref 136–145)

## 2018-05-21 RX ADMIN — FERROUS SULFATE TAB EC 324 MG (65 MG FE EQUIVALENT) SCH MG: 324 (65 FE) TABLET DELAYED RESPONSE at 08:11

## 2018-05-21 RX ADMIN — BUDESONIDE AND FORMOTEROL FUMARATE DIHYDRATE SCH MG: 160; 4.5 AEROSOL RESPIRATORY (INHALATION) at 08:10

## 2018-05-21 NOTE — PN
DATE:  05/21/2018

 

S:  Ms. Avilez was admitted for UTI and possible sepsis and looks like primary

urine culture is negative.  We are still waiting on identification of a positive

blood culture.  She has shown clinical improvement since admission with

resolution of her elevated white count.  CRP is trending down and she has been

afebrile.  Since the time of admission, she does have a right-sided lung lesion.

She has been having some ongoing issues apparently over the last months with

weight loss and poor appetite, very concerning given this lung lesion.  She does

not admit to any acute distress today, still not very hungry and having poor

appetite.

 

PHYSICAL EXAMINATION:

GENERAL:  She is a pleasant, alert, and cooperative.

HEENT:  Grossly benign.  No cervical or supraclavicular adenopathy felt.

LUNGS:  Sounds are slightly diminished, but otherwise clear.

CARDIAC:  Tones are regular.

ABDOMEN:  Soft, appears nontender.  We can not elicit any discomfort in the mid

epigastric area.

EXTREMITIES:  No peripheral edema seen.

 

ASSESSMENT:

1. SEPSIS WITH POSITIVE BLOOD CULTURES.  ID PENDING.

2. URINARY TRACT INFECTION.

3. RIGHT LUNG MASS.

4. WEIGHT LOSS WITH POOR APPETITE.

 

P:  I am going to get a CT of the patient's chest, abdomen, and pelvis without

IV contrast oral only.  We will continue with current IV fluids and appropriate

antibiotics until ID of the blood culture.  The patient will likely be stable

for discharge tomorrow.

 

REYNALDO/RL

DD:  05/21/2018 09:18:45

DT:  05/21/2018 09:52:20

Job #:  [id number]

## 2018-05-22 RX ADMIN — FERROUS SULFATE TAB EC 324 MG (65 MG FE EQUIVALENT) SCH MG: 324 (65 FE) TABLET DELAYED RESPONSE at 08:44

## 2018-05-22 RX ADMIN — BUDESONIDE AND FORMOTEROL FUMARATE DIHYDRATE SCH MG: 160; 4.5 AEROSOL RESPIRATORY (INHALATION) at 08:44

## 2018-05-22 NOTE — PCM.DCSUM1
**Discharge Summary





- Discharge Data


Discharge Date: 05/22/18


Discharge Disposition: Home, Self-Care 01


Condition: Fair





- Discharge Diagnosis/Problem(s)


(1) Mass of right lung


SNOMED Code(s): 456736354


   ICD Code: R91.8 - OTHER NONSPECIFIC ABNORMAL FINDING OF LUNG FIELD   Status: 

Chronic   Priority: High   Current Visit: Yes   





(2) Anemia


SNOMED Code(s): 022274842


   ICD Code: D64.9 - ANEMIA, UNSPECIFIED   Status: Chronic   Current Visit: Yes

   


Qualifiers: 


   Anemia type: unspecified type   Qualified Code(s): D64.9 - Anemia, 

unspecified   





(3) Renal insufficiency


SNOMED Code(s): 582638666, 921868884


   ICD Code: N28.9 - DISORDER OF KIDNEY AND URETER, UNSPECIFIED   Status: Acute

   Current Visit: Yes   





(4) UTI (urinary tract infection)


SNOMED Code(s): 33103188


   ICD Code: N39.0 - URINARY TRACT INFECTION, SITE NOT SPECIFIED   Status: 

Acute   Current Visit: Yes   


Qualifiers: 


   Urinary tract infection type: site unspecified   Hematuria presence: without 

hematuria   Qualified Code(s): N39.0 - Urinary tract infection, site not 

specified   





(5) Sepsis


SNOMED Code(s): 46671972


   ICD Code: A41.9 - SEPSIS, UNSPECIFIED ORGANISM   Status: Acute   Current 

Visit: Yes   


Qualifiers: 


   Sepsis type: sepsis due to unspecified organism   Qualified Code(s): A41.9 - 

Sepsis, unspecified organism   





- Discharge Plan


Home Medications: 


 Home Meds





Lysine 1,000 mg PO DAILY 05/18/18 [History]


Ramipril 10 mg PO DAILY 05/18/18 [History]


Sertraline [Zoloft] 50 mg PO BEDTIME 05/18/18 [History]


Spironolactone [Aldactone] 25 mg PO DAILY 05/18/18 [History]








Forms:  ED Department Discharge


Referrals: 


Provider,Unknown [Ordering Only Provider] - 





- Patient Data


Vitals - Most Recent: 


 Last Vital Signs











Temp  98.5 F   05/22/18 07:18


 


Pulse  86   05/22/18 07:18


 


Resp  19   05/22/18 07:18


 


BP  143/67 H  05/22/18 08:44


 


Pulse Ox  96   05/22/18 07:18











Weight - Most Recent: 120 lb 8 oz


I&O - Last 24 hours: 


 Intake & Output











 05/21/18 05/22/18 05/22/18





 22:59 06:59 14:59


 


Intake Total  1000 


 


Balance  1000 











PAM Results - Last 24 hrs: 


 Microbiology











 05/18/18 21:35 Aerobic Blood Culture - Final





 Blood - Venous    Streptococcus Pneumoniae





 Anaerobic Blood Culture - Final





    Streptococcus Pneumoniae


 


 05/18/18 21:40 Aerobic Blood Culture - Preliminary





 Blood - Venous - Lab Draw Anaerobic Blood Culture - Preliminary


 


 05/18/18 22:04 Urine Culture - Final





 Urine, Clean Catch 











Med Orders - Current: 


 Current Medications





Acetaminophen (Tylenol)  650 mg PO Q4H PRN


   PRN Reason: Pain (Mild 1-3)/fever


   Last Admin: 05/22/18 08:43 Dose:  650 mg


Albuterol/Ipratropium (Duoneb 3.0-0.5 Mg/3 Ml)  3 ml NEB Q4H PRN


   PRN Reason: Shortness Of Breath/wheezing


Ceftriaxone Sodium (Rocephin)  1 gm IVPUSH DAILY@2000 [facility]


   Last Admin: 05/21/18 20:21 Dose:  1 gm


Enoxaparin Sodium (Lovenox)  30 mg SUBCUT DAILY@2000 [facility]


   Last Admin: 05/21/18 20:20 Dose:  30 mg


Ferrous Sulfate (Ferrous Sulfate)  324 mg PO WITHBREAKFAST [facility]


   Last Admin: 05/22/18 08:44 Dose:  324 mg


Lactated Ringer's (Ringers, Lactated)  1,000 mls @ 75 mls/hr IV ASDIRECTED [facility]


   Last Admin: 05/22/18 06:33 Dose:  125 mls/hr


Azithromycin 500 mg/ Sodium (Chloride)  250 mls @ 250 mls/hr IV DAILY@2000 [facility]


   Last Admin: 05/21/18 20:21 Dose:  250 mls/hr


Lisinopril (Prinivil)  20 mg PO DAILY [facility]


   Last Admin: 05/22/18 08:44 Dose:  20 mg


Magnesium Hydroxide (Milk Of Magnesia)  30 ml PO Q12H PRN


   PRN Reason: Constipation


(Lysine [Lysine] (500mg**Own Med**)  1,000 mg PO DAILY [facility]


   Last Admin: 05/22/18 08:44 Dose:  1,000 mg


Pantoprazole Sodium (Protonix Iv***)  40 mg IVPUSH ACBREAKFAST [facility]


   Last Admin: 05/22/18 06:33 Dose:  40 mg


Sertraline HCl (Zoloft)  50 mg PO BEDTIME [facility]


   Last Admin: 05/21/18 20:21 Dose:  50 mg


Spironolactone (Aldactone)  25 mg PO DAILY [facility]


   Last Admin: 05/22/18 08:44 Dose:  25 mg


Temazepam (Restoril)  15 mg PO BEDTIME PRN


   PRN Reason: Sleep





Discontinued Medications





Ceftriaxone Sodium (Rocephin)  1 gm IVPUSH Q24H [facility]


   Last Admin: 05/21/18 00:30 Dose:  1 gm


Enoxaparin Sodium (Lovenox)  30 mg SUBCUT Q24H [facility]


   Last Admin: 05/20/18 23:30 Dose:  30 mg


Azithromycin 500 mg/ Sodium (Chloride)  250 mls @ 250 mls/hr IV Q24H [facility]


   Last Admin: 05/20/18 00:30 Dose:  250 mls/hr

## 2019-01-07 NOTE — PN
DATE:  01/04/2019

 

S:  Nidhi is feeling fine.  She really does not have any complaints.  Her blood

pressures are up.  EGD today showed no signs of any reason for her weight loss

and dyspepsia.

 

O:  GENERAL:  On exam, she is in her usual state.  Pleasant and cooperative.

NECK:  Neck veins are flat.

LUNGS:  Clear.

CARDIAC:  Tones are regular.

ABDOMEN:  Remains soft with audible bowel sounds.  Minimal epigastric and right

upper quadrant pain to palpation.

 

ASSESSMENT:

1. WEIGHT LOSS.

2. CHRONIC RENAL INSUFFICIENCY WITH ACUTE EXACERBATION.

3. HYPERTENSION.

 

P:  We are going to proceed with gallbladder ultrasound this morning.  I am

going to start her on Norvasc for blood pressure control.  I had a long

discussion with her and her family about etiology of her weight loss.  She did

have CT scans of the chest, abdomen, and pelvis back in this past summer which

were negative and her pneumonia cleared nicely.  Chest x-ray shows no

abnormalities.  At this point, we are going to see what her gallbladder

ultrasound shows.  Get her blood pressure control before we proceed with further

diagnostic imaging including potentially repeating CT chest, abdomen, and

pelvis.

 

REYNALDO/RL

DD:  01/04/2019 10:55:51

DT:  01/04/2019 13:52:11

Job #:  [id number]

## 2019-01-07 NOTE — OR
DATE OF OPERATION:  01/04/2019

 

PREOPERATIVE DIAGNOSIS:  DYSPEPSIA WITH WEIGHT LOSS.

 

POSTOPERATIVE DIAGNOSIS:  DYSPEPSIA WITH WEIGHT LOSS.

 

SURGEON:  Beny Randolph MD

 

PROCEDURE:  ESOPHAGOGASTRODUODENOSCOPY WITH BIOPSIES X2, SHAW.

 

ANESTHESIA:  CRNA due to chronic renal insufficiency and advanced age.

 

COMPLICATIONS:  None.

 

SPECIMEN:

1. Antral biopsy x2.

2. SHAW.

 

FINDINGS:

1. Full-length EGD.

2. Minimal antral gastritis with 2 small erosions.

3. Small hiatal hernia without esophagitis.

4. No signs of any bleeding sites, masses, or tumors.

 

RECOMMENDATIONS:  Ongoing medical followup.

 

INDICATIONS:  Ms. Avilez was admitted to the facility with weight loss.  She had

this ongoing now for many months and she is down about 25 pounds and complaining

of epigastric pain and fullness.  She does have known cholelithiasis, but really

does not have a lot of postprandial abdominal pain.  Exam showed her to have mid

epigastric tenderness and we elected to proceed with EGD.

 

DESCRIPTION OF PROCEDURE:  The patient was prepped and draped, placed in the

left lateral decubitus position.  A lubricated Olympus gastroscope was inserted

over a bit and advanced into the cricopharyngeus area, and easily intubated in

the esophagus.  The esophageal lining was benign in its entire course.  The Z-

line was crisp and sharp at 36 cm.  Small hiatal hernia was present without any

spontaneous GERD seen.  There was no distal esophagitis, stricturing,

ulceration, or Dang's changes.  The scope was advanced into the stomach

through the pylorus into the second portion of duodenum.  This and the duodenal

bulb were completely benign.  The scope was brought back into the stomach and

retroflexed.  The upper fundus and cardia were unremarkable.  Upon

straightening, the rest of the fundus appeared benign.  The antrum had some

minimal changes of gastritis with few small erosions.  Two biopsies were taken

along with a CLOtest.  Air was then suctioned and scope removed without

complication.

 

REYNALDO/RL

DD:  01/04/2019 10:54:30

DT:  01/04/2019 17:52:37

Job #:  [id number]

## 2019-01-07 NOTE — DISCH
ADMISSION DIAGNOSES:

1. Acute-on-chronic renal failure.

2. Anemia related to above.

3. Anorexia.

4. Hypertension.

 

DISCHARGE DIAGNOSIS:

1. ACUTE-ON-CHRONIC RENAL FAILURE.

2. ANEMIA RELATED TO ABOVE.

3. ANOREXIA.

4. HYPERTENSION.

 

HISTORY:  The patient was seen in my office today on the day of admission for

weight loss ongoing without any identifiable etiology.  Six months prior, she

has had CT scan of chest, abdomen, and pelvis without any finding of cause, and

this is the first time I have seen her since then.  She came back with a

creatinine of 3.2 and her blood pressures were up slightly.  We elected to put

her into the hospital and scope her as she is having a lot of abdominal fullness

with eating.

 

HOSPITAL COURSE:  The patient was admitted, routine lab work did show chronic

anemia related to her renal failure.  Workup in that regard including B12, folic

acid, iron levels are pending.  The morning after admission, we did an EGD which

was essentially normal.  CT scan unfortunately done with no contrast by local

provider showed no gross abnormalities.  We did stop her ACE inhibitor as her

renal function is elevated and put her on Norvasc. Her blood pressures are

better and we are continuing to monitor and titrate it as an outpatient.  At

this time, she appears comfortable going home.  We are going to continue to

workup her anemia as an outpatient and likely get her into Nephrology.  She

likely will need erythropoietin in the near future, waiting on a level.  We will

continue to monitor the weight loss, but right as of now, no other identifiable

etiology is found.  I will see her back in clinic next week for repeat panel 8.

 

COMPLICATIONS:  During her stay were none.

 

CONSULTATIONS:  None.

 

PROCEDURES:  EGD.

 

DISPOSITION:  Discharged home.

 

REYNALDO/RL

DD:  01/05/2019 11:19:45

DT:  01/05/2019 12:00:44

Job #:  [id number]